# Patient Record
Sex: FEMALE | Race: WHITE | NOT HISPANIC OR LATINO | Employment: UNEMPLOYED | URBAN - METROPOLITAN AREA
[De-identification: names, ages, dates, MRNs, and addresses within clinical notes are randomized per-mention and may not be internally consistent; named-entity substitution may affect disease eponyms.]

---

## 2017-05-13 ENCOUNTER — HOSPITAL ENCOUNTER (EMERGENCY)
Facility: HOSPITAL | Age: 37
Discharge: HOME | End: 2017-05-13
Attending: EMERGENCY MEDICINE

## 2017-05-13 VITALS
BODY MASS INDEX: 37.3 KG/M2 | TEMPERATURE: 98 F | DIASTOLIC BLOOD PRESSURE: 93 MMHG | OXYGEN SATURATION: 100 % | RESPIRATION RATE: 18 BRPM | SYSTOLIC BLOOD PRESSURE: 180 MMHG | WEIGHT: 190 LBS | HEIGHT: 60 IN | HEART RATE: 83 BPM

## 2017-05-13 DIAGNOSIS — K04.7 DENTAL ABSCESS: Primary | ICD-10-CM

## 2017-05-13 PROCEDURE — 25000003 PHARM REV CODE 250: Performed by: NURSE PRACTITIONER

## 2017-05-13 PROCEDURE — 99283 EMERGENCY DEPT VISIT LOW MDM: CPT | Mod: 25

## 2017-05-13 PROCEDURE — 96372 THER/PROPH/DIAG INJ SC/IM: CPT

## 2017-05-13 RX ORDER — HYDROCODONE BITARTRATE AND ACETAMINOPHEN 10; 325 MG/1; MG/1
1 TABLET ORAL EVERY 6 HOURS PRN
Qty: 12 TABLET | Refills: 0 | Status: SHIPPED | OUTPATIENT
Start: 2017-05-13

## 2017-05-13 RX ORDER — AMOXICILLIN 500 MG/1
500 CAPSULE ORAL 3 TIMES DAILY
Qty: 21 CAPSULE | Refills: 0 | Status: SHIPPED | OUTPATIENT
Start: 2017-05-13 | End: 2017-05-20

## 2017-05-13 RX ORDER — HYDROCODONE BITARTRATE AND ACETAMINOPHEN 10; 325 MG/1; MG/1
1 TABLET ORAL
Status: COMPLETED | OUTPATIENT
Start: 2017-05-13 | End: 2017-05-13

## 2017-05-13 RX ORDER — METRONIDAZOLE 500 MG/1
500 TABLET ORAL 3 TIMES DAILY
Qty: 21 TABLET | Refills: 0 | Status: SHIPPED | OUTPATIENT
Start: 2017-05-13 | End: 2017-05-20

## 2017-05-13 RX ORDER — CLINDAMYCIN PHOSPHATE 150 MG/ML
450 INJECTION, SOLUTION INTRAVENOUS
Status: COMPLETED | OUTPATIENT
Start: 2017-05-13 | End: 2017-05-13

## 2017-05-13 RX ADMIN — CLINDAMYCIN PHOSPHATE 450 MG: 150 INJECTION, SOLUTION INTRAMUSCULAR; INTRAVENOUS at 03:05

## 2017-05-13 RX ADMIN — HYDROCODONE BITARTRATE AND ACETAMINOPHEN 1 TABLET: 10; 325 TABLET ORAL at 02:05

## 2017-05-13 NOTE — ED PROVIDER NOTES
SCRIBE #1 NOTE: I, Abraham Tr, am scribing for, and in the presence of, Efrain Carlisle NP. I have scribed the entire note.      History      Chief Complaint   Patient presents with    Facial Swelling     left side of face    Abscess       Review of patient's allergies indicates:  No Known Allergies     HPI   HPI    5/13/2017, 2:05 PM   History obtained from the patient      History of Present Illness: Carol Monroy Case is a 36 y.o. female patient who presents to the Emergency Department for an evaluation of left sided facial swelling which onset gradually a few days ago. Symptoms are constant and moderate in severity. Exacerbated by nothing and relieved by nothing. Associated sx include dental pain. Patient denies any fever, chills, difficulty swallowing, skin rash, tongue swelling, voice change, SOB, drooling, and all other sxs at this time. Pt states her swelling is due to an abscess. Pt reports she has not bee prescribed abx, but does have access to abx from past illness. No further complaints or concerns at this time.     Arrival mode: Personal vehicle    PCP: No primary care provider on file.       Past Medical History:  No past medical history on file.    Past Surgical History:  No past surgical history on file.      Family History:  No family history on file.    Social History:  Social History     Social History Main Topics    Smoking status: Not on file    Smokeless tobacco: Not on file    Alcohol use Not on file    Drug use: Not on file    Sexual activity: Not on file       ROS   Review of Systems   Constitutional: Negative for appetite change, chills, diaphoresis and fever.   HENT: Positive for dental problem and facial swelling. Negative for drooling, sore throat, trouble swallowing and voice change.    Respiratory: Negative for shortness of breath.    Cardiovascular: Negative for chest pain.   Gastrointestinal: Negative for abdominal pain, diarrhea, nausea and vomiting.   Genitourinary: Negative  for decreased urine volume, difficulty urinating, dysuria and hematuria.   Musculoskeletal: Negative for back pain, neck pain and neck stiffness.   Skin: Negative for rash.   Neurological: Negative for dizziness, weakness, light-headedness and headaches.   Hematological: Does not bruise/bleed easily.     Physical Exam    Initial Vitals   BP Pulse Resp Temp SpO2   05/13/17 1355 05/13/17 1355 05/13/17 1355 05/13/17 1355 05/13/17 1355   180/93 83 18 98.3 °F (36.8 °C) 100 %      Physical Exam  Nursing Notes and Vital Signs Reviewed.  Constitutional: Patient is in no acute distress. Awake and alert. Well-developed and well-nourished.  Head: Atraumatic. Normocephalic.  Eyes: PERRL. EOM intact. Conjunctivae are not pale. No scleral icterus.  ENT: Mucous membranes are moist. Tenderness noted to left bottom second molar. Moderate swelling of left mandible noted with no area of fluctuance.   Neck: Supple. Full ROM. No lymphadenopathy.  Cardiovascular: Regular rate. Regular rhythm.   Pulmonary/Chest: No respiratory distress.   Abdominal: Soft and non-distended.    Musculoskeletal: Moves all extremities. No obvious deformities.   Skin: Warm and dry.  Neurological:  Alert, awake, and appropriate.  Normal speech.  No acute focal neurological deficits are appreciated.  Psychiatric: Normal affect. Good eye contact. Appropriate in content.    ED Course    Procedures  ED Vital Signs:  Vitals:    05/13/17 1355   BP: (!) 180/93   Pulse: 83   Resp: 18   Temp: 98.3 °F (36.8 °C)   TempSrc: Oral   SpO2: 100%   Weight: 86.2 kg (190 lb)   Height: 5' (1.524 m)            The Emergency Provider reviewed the vital signs and test results, which are outlined above.    ED Discussion      2:12 PM: Reassessed pt at this time. Pt is awake, alert, and in NAD. Discussed with pt all pertinent ED information. Discussed pt dx of dental abscess and plan of tx. Gave pt all f/u and return to the ED instructions. All questions and concerns were addressed at  this time. Pt expresses understanding of information and instructions, and is comfortable with plan to discharge. Pt is stable for discharge.    I discussed with patient and/or family/caretaker that evaluation in the ED does not suggest any emergent or life threatening medical conditions requiring immediate intervention beyond what was provided in the ED, and I believe patient is safe for discharge.  Regardless, an unremarkable evaluation in the ED does not preclude the development or presence of a serious of life threatening condition. As such, patient was instructed to return immediately for any worsening or change in current symptoms.      ED Medication(s):  Medications   clindamycin injection 450 mg (not administered)   hydrocodone-acetaminophen 10-325mg per tablet 1 tablet (not administered)       New Prescriptions    AMOXICILLIN (AMOXIL) 500 MG CAPSULE    Take 1 capsule (500 mg total) by mouth 3 (three) times daily.    HYDROCODONE-ACETAMINOPHEN 10-325MG (NORCO)  MG TAB    Take 1 tablet by mouth every 6 (six) hours as needed for Pain.    METRONIDAZOLE (FLAGYL) 500 MG TABLET    Take 1 tablet (500 mg total) by mouth 3 (three) times daily.       Follow-up Information     Schedule an appointment as soon as possible for a visit with dentist of choice.    Why:  As needed            Medical Decision Making              Scribe Attestation:   Scribe #1: I performed the above scribed service and the documentation accurately describes the services I performed. I attest to the accuracy of the note.    Attending:   Physician Attestation Statement for Scribe #1: I, Efrain Carlisle NP, personally performed the services described in this documentation, as scribed by Abraham Armando, in my presence, and it is both accurate and complete.          Clinical Impression       ICD-10-CM ICD-9-CM   1. Dental abscess K04.7 522.5       Disposition:   Disposition: Discharged  Condition: Stable         Efrain Carlisle NP  05/13/17  8521

## 2017-05-13 NOTE — ED AVS SNAPSHOT
[facility]  [id number] [facility] [id number]               Carol Monroy Case   2017  2:02 PM   ED    Description:  Female : 1980   Department:  [facility]            Your Care was Coordinated By:     Provider Role From To    Efrain Carlisle NP Nurse Practitioner 17 1407 --      Reason for Visit     Facial Swelling     Abscess           Diagnoses this Visit        Comments    Dental abscess    -  Primary       ED Disposition     None           To Do List           Follow-up Information     Schedule an appointment as soon as possible for a visit with dentist of choice.    Why:  As needed      [facility]     [facility] Care Line -  Assistance  Unless otherwise directed by your provider, please contact [facility], our nurse care line that is available for  assistance.     Registered nurses in the [facility] provide: appointment scheduling, clinical advisement, health education, and other advisory services.  Call: [phone] (toll free)               Medications           Message regarding Medications     Verify the changes and/or additions to your medication regime listed below are the same as discussed with your clinician today.  If any of these changes or additions are incorrect, please notify your healthcare provider.        These medications were administered today        Dose Freq    clindamycin injection 450 mg 450 mg ED 1 Time    Sig: Inject 3 mLs (450 mg total) into the muscle ED 1 Time.    Class: Normal    Route: Intramuscular    hydrocodone-acetaminophen 10-325mg per tablet 1 tablet 1 tablet ED 1 Time    Sig: Take 1 tablet by mouth ED 1 Time.    Class: Normal    Route: Oral    Cosign for Ordering: Required by Choco Garcia Jr., MD           Verify that the below list of medications is an accurate representation of the medications you are currently taking.  If none reported, the list  may be blank. If incorrect, please contact your healthcare provider. Carry this list with you in case of emergency.           Current Medications     clindamycin injection 450 mg Inject 3 mLs (450 mg total) into the muscle ED 1 Time.    hydrocodone-acetaminophen 10-325mg per tablet 1 tablet Take 1 tablet by mouth ED 1 Time.           Clinical Reference Information           Your Vitals Were     BP Pulse Temp Resp Height Weight    180/93 (BP Location: Right arm, Patient Position: Sitting) 83 98.3 °F (36.8 °C) (Oral) 18 5' (1.524 m) 86.2 kg (190 lb)    SpO2 BMI             100% 37.11 kg/m2         Allergies as of 5/13/2017     No Known Allergies      Immunizations Administered on Date of Encounter - 5/13/2017     None      ED Micro, Lab, POCT     None      ED Imaging Orders     None        Discharge Instructions         Dental Abscess    An abscess is a pocket of pus at the tip of a tooth root in your jaw bone. It is caused by an infection at the root of the tooth. It can cause pain and swelling of the gum, cheek, or jaw. Pain may spread from the tooth to your ear or the area of your jaw on the same side. If the abscess isnt treated, it appears as a bubble or swelling on the gum near the tooth. The pressure that builds in this swelling is the source of the pain. More serious infections cause your face to swell.  An abscess can be caused by a crack in the tooth, a cavity, a gum infection, or a combination of these. Once the pulp of the tooth is exposed, bacteria can spread down the roots to the tip. If the bacteria are not stopped, they can damage the bone and soft tissue, and an abscess can form.  Home care  Follow these guidelines when caring for yourself at home:  · Avoid hot and cold foods and drinks. Your tooth may be sensitive to changes in temperature. Dont chew on the side of the infected tooth.  · If your tooth is chipped or cracked, or if there is a large open cavity, put oil of cloves directly on the tooth  "to relieve pain. You can buy oil of cloves at drugstores. Some pharmacies carry an over-the-counter "toothache kit." This contains a paste that you can put on the exposed tooth to make it less sensitive.  · Put a cold pack on your jaw over the sore area to help reduce pain.  · You may use over-the-counter medicine to ease pain, unless another medicine was prescribed. If you have chronic liver or kidney disease, talk with your healthcare provider before using acetaminophen or ibuprofen. Also talk with your provider if youve had a stomach ulcer or GI bleeding.  · An antibiotic will be prescribed. Take it until finished, even if you are feeling better after a few days.  Follow-up care  Follow up with your dentist or an oral surgeon, or as advised. Once an infection occurs in a tooth, it will continue to be a problem until the infection is drained. This is done through surgery or a root canal. Or you may need to have your tooth pulled.  Call 911  Call 911 if any of these occur:  · Unusual drowsiness  · Headache or stiff neck  · Weakness or fainting  · Difficulty swallowing, breathing, or opening your mouth  · Swollen eyelids  When to seek medical advice  Call your healthcare provider right away if any of these occur:  · Your face becomes more swollen or red  · Pain gets worse or spreads to your neck  · Fever of 100.4º F (38.0º C) or higher, or as directed by your healthcare provider  · Pus drains from the tooth  Date Last Reviewed: 10/1/2016  © 9920-9750 [employer]. [address] [place] [address], [place] [address]. All rights reserved. This information is not intended as a substitute for professional medical care. Always follow your healthcare professional's instructions.          Smoking Cessation     If you would like to quit smoking:   You may be eligible for free services if you are a [place] resident and started smoking cigarettes before September 1, 1988.  Call the [employer]oking [employer]essation [employer] (SCT) toll " free at (580) 856-5364 or (743) 801-7399.   Call 1-800-QUIT-NOW if you do not meet the above criteria.   Contact us via email: [email or web address]   View our website for more information: [email or web address]         [facility] complies with applicable Federal civil rights laws and does not discriminate on the basis of race, color, national origin, age, disability, or sex.        Language Assistance Services     ATTENTION: Language assistance services are available, free of charge. Please call [phone].      ATENCIÓN: Si habla español, tiene a herrera disposición servicios gratuitos de asistencia lingüística. Llame al [phone].     CHÚ Ý: N?u b?n nói Ti?ng Vi?t, có các d?ch v? h? tr? ngôn ng? mi?n phí dành cho b?n. G?i s? [phone].

## 2017-05-13 NOTE — DISCHARGE INSTRUCTIONS
"  Dental Abscess    An abscess is a pocket of pus at the tip of a tooth root in your jaw bone. It is caused by an infection at the root of the tooth. It can cause pain and swelling of the gum, cheek, or jaw. Pain may spread from the tooth to your ear or the area of your jaw on the same side. If the abscess isnt treated, it appears as a bubble or swelling on the gum near the tooth. The pressure that builds in this swelling is the source of the pain. More serious infections cause your face to swell.  An abscess can be caused by a crack in the tooth, a cavity, a gum infection, or a combination of these. Once the pulp of the tooth is exposed, bacteria can spread down the roots to the tip. If the bacteria are not stopped, they can damage the bone and soft tissue, and an abscess can form.  Home care  Follow these guidelines when caring for yourself at home:  · Avoid hot and cold foods and drinks. Your tooth may be sensitive to changes in temperature. Dont chew on the side of the infected tooth.  · If your tooth is chipped or cracked, or if there is a large open cavity, put oil of cloves directly on the tooth to relieve pain. You can buy oil of cloves at drugstores. Some pharmacies carry an over-the-counter "toothache kit." This contains a paste that you can put on the exposed tooth to make it less sensitive.  · Put a cold pack on your jaw over the sore area to help reduce pain.  · You may use over-the-counter medicine to ease pain, unless another medicine was prescribed. If you have chronic liver or kidney disease, talk with your healthcare provider before using acetaminophen or ibuprofen. Also talk with your provider if youve had a stomach ulcer or GI bleeding.  · An antibiotic will be prescribed. Take it until finished, even if you are feeling better after a few days.  Follow-up care  Follow up with your dentist or an oral surgeon, or as advised. Once an infection occurs in a tooth, it will continue to be a problem " until the infection is drained. This is done through surgery or a root canal. Or you may need to have your tooth pulled.  Call 911  Call 911 if any of these occur:  · Unusual drowsiness  · Headache or stiff neck  · Weakness or fainting  · Difficulty swallowing, breathing, or opening your mouth  · Swollen eyelids  When to seek medical advice  Call your healthcare provider right away if any of these occur:  · Your face becomes more swollen or red  · Pain gets worse or spreads to your neck  · Fever of 100.4º F (38.0º C) or higher, or as directed by your healthcare provider  · Pus drains from the tooth  Date Last Reviewed: 10/1/2016  © 9124-4973 [employer]. [address] [place] [address], [place] [address]. All rights reserved. This information is not intended as a substitute for professional medical care. Always follow your healthcare professional's instructions.